# Patient Record
Sex: FEMALE | URBAN - METROPOLITAN AREA
[De-identification: names, ages, dates, MRNs, and addresses within clinical notes are randomized per-mention and may not be internally consistent; named-entity substitution may affect disease eponyms.]

---

## 2019-10-30 ENCOUNTER — OUTPATIENT (OUTPATIENT)
Dept: OUTPATIENT SERVICES | Facility: HOSPITAL | Age: 69
LOS: 1 days | End: 2019-10-30
Payer: MEDICARE

## 2019-10-30 DIAGNOSIS — M86.172 OTHER ACUTE OSTEOMYELITIS, LEFT ANKLE AND FOOT: ICD-10-CM

## 2019-10-31 PROCEDURE — 78103 BONE MARROW IMAGING MULT: CPT

## 2019-10-31 PROCEDURE — 78805: CPT | Mod: 26

## 2019-10-31 PROCEDURE — 78800 RP LOCLZJ TUM 1 AREA 1 D IMG: CPT

## 2019-10-31 PROCEDURE — 78103 BONE MARROW IMAGING MULT: CPT | Mod: 26

## 2020-01-21 ENCOUNTER — APPOINTMENT (OUTPATIENT)
Dept: RHEUMATOLOGY | Facility: CLINIC | Age: 70
End: 2020-01-21
Payer: MEDICARE

## 2020-01-21 ENCOUNTER — TRANSCRIPTION ENCOUNTER (OUTPATIENT)
Age: 70
End: 2020-01-21

## 2020-01-21 VITALS
WEIGHT: 125 LBS | SYSTOLIC BLOOD PRESSURE: 144 MMHG | HEART RATE: 83 BPM | OXYGEN SATURATION: 96 % | DIASTOLIC BLOOD PRESSURE: 85 MMHG | HEIGHT: 62 IN | BODY MASS INDEX: 23 KG/M2

## 2020-01-21 DIAGNOSIS — Z87.09 PERSONAL HISTORY OF OTHER DISEASES OF THE RESPIRATORY SYSTEM: ICD-10-CM

## 2020-01-21 DIAGNOSIS — Z86.79 PERSONAL HISTORY OF OTHER DISEASES OF THE CIRCULATORY SYSTEM: ICD-10-CM

## 2020-01-21 DIAGNOSIS — Z82.61 FAMILY HISTORY OF ARTHRITIS: ICD-10-CM

## 2020-01-21 DIAGNOSIS — Z78.9 OTHER SPECIFIED HEALTH STATUS: ICD-10-CM

## 2020-01-21 PROBLEM — Z00.00 ENCOUNTER FOR PREVENTIVE HEALTH EXAMINATION: Status: ACTIVE | Noted: 2020-01-21

## 2020-01-21 PROCEDURE — 99205 OFFICE O/P NEW HI 60 MIN: CPT

## 2020-01-21 NOTE — DATA REVIEWED
[FreeTextEntry1] : MRI left foot July 15 2019\par - non-displaced fracture proximal metatarsal\par - mild degenerative changes at first MTP\par - soft tissue edema\par - intermetatarsal neuroma 2nd web space\par - non-aggressive appearing lesion in the proximal aspect of the third metatarsal - ? enchondroma\par \par MRI left foot September 19 2019\par - new hypointense T1 hyperintense T2 signal cubcutaneous mass  - extending to the dorsal surface of the medal mid navicular - c/w abcess with sinus tract - marrow edema and erosive changes of the navicular-cuneiform aritculation -c/w osteomyelitis with reactive osteitis\par -  no joint effusion\par \par note from ID - suggests negative bone scan but raises neuropathic component

## 2020-01-21 NOTE — ASSESSMENT
[FreeTextEntry1] : 70 yo woman hx of asthma (steroid use), htn (hctz in summer) presenting with chronic foot pain and swelling\par \par #chronic left foot pain - with swelling, warmth, erythema and loss of function folowing a fracture and ? om\par - ? RSD - bone scan triple phase - will check with id to ensure they didn’t do this already \par - initial event seems to be the fracture in july - though no trauma and unsuall site c/b abcess.  Was on HCTZ at the time, though response to prednisone wasn't complete. doubt gout.\par - no other signs of inflammatory ctd on exam \par - check serologies\par - id note suggests bone scan with neuropathic component - LMOM - with Dr. Bullard (ID) in [place] - to further discuss and retrieve records.\par \par \par #Bone health.  has had BSO and steroid use - risk is high for OP. now with unexplained fracture\par  - DEXA for fracture, check vit d\par - id following for source of infection initially\par - r/o gout - cehck sUA\par \par d/w podiatry Dr. Antoine.   July's swelling felt to be 2/2 new fracture as xray negative for fracture but MRI positive.  was treated as fracture, though swelli gand redness persistent.  unclear what preciptated the infections as well.  has not had a triple phase bone scan. \par \par LM with id\par \par

## 2020-01-21 NOTE — PHYSICAL EXAM
[General Appearance - Alert] : alert [General Appearance - In No Acute Distress] : in no acute distress [General Appearance - Well Nourished] : well nourished [General Appearance - Well Developed] : well developed [General Appearance - Well-Appearing] : healthy appearing [PERRL With Normal Accommodation] : pupils were equal in size, round, and reactive to light [Sclera] : the sclera and conjunctiva were normal [Extraocular Movements] : extraocular movements were intact [Oropharynx] : the oropharynx was normal [Outer Ear] : the ears and nose were normal in appearance [Neck Cervical Mass (___cm)] : no neck mass was observed [Neck Appearance] : the appearance of the neck was normal [Thyroid Diffuse Enlargement] : the thyroid was not enlarged [Jugular Venous Distention Increased] : there was no jugular-venous distention [Thyroid Nodule] : there were no palpable thyroid nodules [Auscultation Breath Sounds / Voice Sounds] : lungs were clear to auscultation bilaterally [Heart Sounds Gallop] : no gallops [Heart Sounds] : normal S1 and S2 [Heart Rate And Rhythm] : heart rate was normal and rhythm regular [Murmurs] : no murmurs [Heart Sounds Pericardial Friction Rub] : no pericardial rub [Edema] : there was no peripheral edema [Cervical Lymph Nodes Enlarged Posterior Bilaterally] : posterior cervical [Full Pulse] : the pedal pulses are present [Cervical Lymph Nodes Enlarged Anterior Bilaterally] : anterior cervical [Supraclavicular Lymph Nodes Enlarged Bilaterally] : supraclavicular [No CVA Tenderness] : no ~M costovertebral angle tenderness [No Spinal Tenderness] : no spinal tenderness [Abnormal Walk] : normal gait [Nail Clubbing] : no clubbing  or cyanosis of the fingernails [Musculoskeletal - Swelling] : no joint swelling seen [Motor Tone] : muscle strength and tone were normal [Skin Color & Pigmentation] : normal skin color and pigmentation [Skin Turgor] : normal skin turgor [FreeTextEntry1] : left > right CMC joitn squaring, no synovitis, tinels and phalens negatvie.   left foot with mild heat, erythema, dorsal swelling, decreased rom at tt joint. not sensitive to touch [Deep Tendon Reflexes (DTR)] : deep tendon reflexes were 2+ and symmetric [Skin Lesions] : no skin lesions [] : no rash [Motor Exam] : the motor exam was normal [No Focal Deficits] : no focal deficits [Sensation] : the sensory exam was normal to light touch and pinprick [Impaired Insight] : insight and judgment were intact [Oriented To Time, Place, And Person] : oriented to person, place, and time [Affect] : the affect was normal [Memory Recent] : recent memory was not impaired [Mood] : the mood was normal [Memory Remote] : remote memory was not impaired

## 2020-01-21 NOTE — REASON FOR VISIT
[Consultation] : a consultation visit [FreeTextEntry1] : referred to Dr. kris Tolliver (podiatry), Dr. Daphnie Mas (PCP)

## 2020-01-21 NOTE — HISTORY OF PRESENT ILLNESS
[None] : The patient is currently asymptomatic [FreeTextEntry1] : 68 yo woman pmhx of Asthma, HTN here with left foot pain\par \par Ms. Ortega awakened on July 4 2019 with acute pain, swelling, redness and heat of the lfet foot.  toes were not involved, nor was the ankle.  there was no antecedent trauma and not other arthritis.   Pain was excruciating and went to urgent care.  Started treatment with prednisone that did not alleviate the symptoms much.  \par \par Saw podiatry ([facility] foot [facility] - dr ponce).  treated with 6 weeks  of boot.   course was complicated by infection +/- OM x 2 treated with abx.  never had fever, chills, ns and workup reportedly negative.  ID is dr sung (id) [facility] \par \par Foot is still not right.  pain is not excruciating but it is still swollen and a bit off color - red.   It is no longer sensitive to touch and can bear weight.  the end of the day is worse than the beginning of the day./  swelling progresses.  \par \par rheum ros is negative for inflammatory symptoms or associated symptoms\par - other joints are without pain except for occasional  pain at the left cmc joint.  \par - denies heat, redness or sweelling in other joints.\par - denies parasthesais or numbness in this or other joits\par - denies posriasis, ibd, sti, infalmmatory diarrhea\par - denies sle or ra s/s\par - never had gout, rarely drinks alcohol, doesn’t eat red meat and never has organ meats.  has 3 shrimp on occasion\par \par Bone health\par -hx of steroid use 2/2 asthma \par -hasn’t had bone density in years\par - no prior fracture\par - no height loss [de-identified] : - denies fever, chills, night sweats.  no constitutional symptoms\par - denies trauma, osteoporosis\par - ID = Jackson Jimenez ([place]) -

## 2020-01-23 LAB
25(OH)D3 SERPL-MCNC: 47.5 NG/ML
ALBUMIN SERPL ELPH-MCNC: 4.8 G/DL
ALP BLD-CCNC: 107 U/L
ALT SERPL-CCNC: 20 U/L
ANION GAP SERPL CALC-SCNC: 17 MMOL/L
AST SERPL-CCNC: 29 U/L
BASOPHILS # BLD AUTO: 0.09 K/UL
BASOPHILS NFR BLD AUTO: 1 %
BILIRUB SERPL-MCNC: 0.3 MG/DL
BUN SERPL-MCNC: 19 MG/DL
CALCIUM SERPL-MCNC: 9.8 MG/DL
CHLORIDE SERPL-SCNC: 108 MMOL/L
CO2 SERPL-SCNC: 17 MMOL/L
CREAT SERPL-MCNC: 1.17 MG/DL
CRP SERPL-MCNC: 0.6 MG/DL
EOSINOPHIL # BLD AUTO: 0.24 K/UL
EOSINOPHIL NFR BLD AUTO: 2.5 %
ERYTHROCYTE [SEDIMENTATION RATE] IN BLOOD BY WESTERGREN METHOD: 35 MM/HR
HCT VFR BLD CALC: 37.5 %
HGB BLD-MCNC: 11.8 G/DL
IMM GRANULOCYTES NFR BLD AUTO: 0.3 %
LYMPHOCYTES # BLD AUTO: 2.52 K/UL
LYMPHOCYTES NFR BLD AUTO: 26.6 %
MAN DIFF?: NORMAL
MCHC RBC-ENTMCNC: 29.6 PG
MCHC RBC-ENTMCNC: 31.5 GM/DL
MCV RBC AUTO: 94.2 FL
MONOCYTES # BLD AUTO: 0.74 K/UL
MONOCYTES NFR BLD AUTO: 7.8 %
NEUTROPHILS # BLD AUTO: 5.84 K/UL
NEUTROPHILS NFR BLD AUTO: 61.8 %
PLATELET # BLD AUTO: 478 K/UL
POTASSIUM SERPL-SCNC: 4.2 MMOL/L
PROT SERPL-MCNC: 7.7 G/DL
RBC # BLD: 3.98 M/UL
RBC # FLD: 15.3 %
SODIUM SERPL-SCNC: 143 MMOL/L
TSH SERPL-ACNC: 3.31 UIU/ML
URATE SERPL-MCNC: 7.7 MG/DL
WBC # FLD AUTO: 9.46 K/UL

## 2020-02-20 ENCOUNTER — FORM ENCOUNTER (OUTPATIENT)
Age: 70
End: 2020-02-20

## 2020-02-21 ENCOUNTER — APPOINTMENT (OUTPATIENT)
Dept: NUCLEAR MEDICINE | Facility: IMAGING CENTER | Age: 70
End: 2020-02-21
Payer: MEDICARE

## 2020-02-21 ENCOUNTER — OUTPATIENT (OUTPATIENT)
Dept: OUTPATIENT SERVICES | Facility: HOSPITAL | Age: 70
LOS: 1 days | End: 2020-02-21
Payer: MEDICARE

## 2020-02-21 DIAGNOSIS — T14.8XXA OTHER INJURY OF UNSPECIFIED BODY REGION, INITIAL ENCOUNTER: ICD-10-CM

## 2020-02-21 PROCEDURE — 78315 BONE IMAGING 3 PHASE: CPT | Mod: 26

## 2020-02-21 PROCEDURE — A9561: CPT

## 2020-02-21 PROCEDURE — 78315 BONE IMAGING 3 PHASE: CPT

## 2020-02-28 ENCOUNTER — APPOINTMENT (OUTPATIENT)
Dept: RHEUMATOLOGY | Facility: CLINIC | Age: 70
End: 2020-02-28
Payer: MEDICARE

## 2020-02-28 VITALS
OXYGEN SATURATION: 99 % | HEART RATE: 88 BPM | HEIGHT: 62 IN | BODY MASS INDEX: 23 KG/M2 | WEIGHT: 125 LBS | SYSTOLIC BLOOD PRESSURE: 157 MMHG | DIASTOLIC BLOOD PRESSURE: 80 MMHG

## 2020-02-28 DIAGNOSIS — T14.8XXA OTHER INJURY OF UNSPECIFIED BODY REGION, INITIAL ENCOUNTER: ICD-10-CM

## 2020-02-28 DIAGNOSIS — M79.672 PAIN IN LEFT FOOT: ICD-10-CM

## 2020-02-28 PROCEDURE — 99214 OFFICE O/P EST MOD 30 MIN: CPT

## 2020-02-28 NOTE — ASSESSMENT
[FreeTextEntry1] : 68 yo woman hx of asthma (steroid use), htn (hctz in summer) presenting with chronic foot pain and swelling\par \par #chronic left foot pain - intermittent non-inflammatory at this point\par - although sUA is slightly eleveated clinical picutre no longer aligns with crystalline, gout.\par - bone scan negative for RSD\par - likely slowly resolved after fracture and OM (?)\par - bone scan report rec f/u MRI\par - if new symptoms or inflammatory symptoms restart - consider aspiration or DECT.\par - observe for now\par \par #Bone health.  has had BSO and steroid use - risk is high for OP. now with unexplained fracture\par  - DEXA for fracture, check vit d\par \par \par

## 2020-02-28 NOTE — HISTORY OF PRESENT ILLNESS
[None] : The patient is currently asymptomatic [FreeTextEntry1] : 70 yo woman pmhx of Asthma, HTN here with left foot pain\par \par Ms. Ortega awakened on July 4 2019 with acute pain, swelling, redness and heat of the lfet foot.  toes were not involved, nor was the ankle.  there was no antecedent trauma and not other arthritis.   Pain was excruciating and went to urgent care.  Started treatment with prednisone that did not alleviate the symptoms much.  \par \par Saw podiatry ([facility] foot care - dr ponce).  treated with 6 weeks  of boot.   course was complicated by infection +/- OM x 2 treated with abx.  never had fever, chills, ns and workup reportedly negative.  ID is dr sung (id) [facility] \par \par Foot is still not right.  pain is not excruciating but it is still swollen and a bit off color - red.   It is no longer sensitive to touch and can bear weight.  the end of the day is worse than the beginning of the day./  swelling progresses.  \par \par rheum ros is negative for inflammatory symptoms or associated symptoms\par - other joints are without pain except for occasional  pain at the left cmc joint.  \par - denies heat, redness or sweelling in other joints.\par - denies parasthesais or numbness in this or other joits\par - denies posriasis, ibd, sti, infalmmatory diarrhea\par - denies sle or ra s/s\par - never had gout, rarely drinks alcohol, doesn’t eat red meat and never has organ meats.  has 3 shrimp on occasion\par \par Bone health\par -hx of steroid use 2/2 asthma \par -hasn’t had bone density in years\par - no prior fracture\par - no height loss [de-identified] : - denies fever, chills, night sweats.  no constitutional symptoms\par - denies trauma, osteoporosis\par - ID = Jackson Jimenez ([place]) -

## 2020-02-28 NOTE — DATA REVIEWED
[FreeTextEntry1] : Bone scan 2020\par \par MRI left foot July 15 2019\par - non-displaced fracture proximal metatarsal\par - mild degenerative changes at first MTP\par - soft tissue edema\par - intermetatarsal neuroma 2nd web space\par - non-aggressive appearing lesion in the proximal aspect of the third metatarsal - ? enchondroma\par \par MRI left foot September 19 2019\par - new hypointense T1 hyperintense T2 signal cubcutaneous mass  - extending to the dorsal surface of the medal mid navicular - c/w abcess with sinus tract - marrow edema and erosive changes of the navicular-cuneiform aritculation -c/w osteomyelitis with reactive osteitis\par -  no joint effusion\par \par note from ID - suggests negative bone scan but raises neuropathic component

## 2020-02-28 NOTE — PHYSICAL EXAM
[General Appearance - Alert] : alert [General Appearance - Well Nourished] : well nourished [General Appearance - In No Acute Distress] : in no acute distress [General Appearance - Well Developed] : well developed [General Appearance - Well-Appearing] : healthy appearing [PERRL With Normal Accommodation] : pupils were equal in size, round, and reactive to light [Sclera] : the sclera and conjunctiva were normal [Extraocular Movements] : extraocular movements were intact [Outer Ear] : the ears and nose were normal in appearance [Oropharynx] : the oropharynx was normal [Neck Cervical Mass (___cm)] : no neck mass was observed [Neck Appearance] : the appearance of the neck was normal [Jugular Venous Distention Increased] : there was no jugular-venous distention [Thyroid Diffuse Enlargement] : the thyroid was not enlarged [Thyroid Nodule] : there were no palpable thyroid nodules [Auscultation Breath Sounds / Voice Sounds] : lungs were clear to auscultation bilaterally [Heart Rate And Rhythm] : heart rate was normal and rhythm regular [Heart Sounds] : normal S1 and S2 [Heart Sounds Gallop] : no gallops [Murmurs] : no murmurs [Heart Sounds Pericardial Friction Rub] : no pericardial rub [Full Pulse] : the pedal pulses are present [Edema] : there was no peripheral edema [Cervical Lymph Nodes Enlarged Posterior Bilaterally] : posterior cervical [Cervical Lymph Nodes Enlarged Anterior Bilaterally] : anterior cervical [Supraclavicular Lymph Nodes Enlarged Bilaterally] : supraclavicular [No CVA Tenderness] : no ~M costovertebral angle tenderness [No Spinal Tenderness] : no spinal tenderness [Abnormal Walk] : normal gait [Nail Clubbing] : no clubbing  or cyanosis of the fingernails [Musculoskeletal - Swelling] : no joint swelling seen [Motor Tone] : muscle strength and tone were normal [Skin Color & Pigmentation] : normal skin color and pigmentation [Skin Turgor] : normal skin turgor [] : no rash [Skin Lesions] : no skin lesions [Deep Tendon Reflexes (DTR)] : deep tendon reflexes were 2+ and symmetric [Sensation] : the sensory exam was normal to light touch and pinprick [Motor Exam] : the motor exam was normal [No Focal Deficits] : no focal deficits [Oriented To Time, Place, And Person] : oriented to person, place, and time [Impaired Insight] : insight and judgment were intact [Affect] : the affect was normal [Mood] : the mood was normal [Memory Recent] : recent memory was not impaired [Memory Remote] : remote memory was not impaired [FreeTextEntry1] : left > right CMC joitn squaring, no synovitis, tinels and phalens negatvie.  Left foot soft tissue swellign dorsal - NT, cool, no redness, no sensitivity

## 2024-12-31 ENCOUNTER — OUTPATIENT (OUTPATIENT)
Dept: OUTPATIENT SERVICES | Facility: HOSPITAL | Age: 74
LOS: 1 days | End: 2024-12-31
Payer: MEDICARE

## 2024-12-31 VITALS
WEIGHT: 115.96 LBS | HEART RATE: 80 BPM | TEMPERATURE: 98 F | SYSTOLIC BLOOD PRESSURE: 130 MMHG | OXYGEN SATURATION: 99 % | RESPIRATION RATE: 14 BRPM | DIASTOLIC BLOOD PRESSURE: 77 MMHG | HEIGHT: 60 IN

## 2024-12-31 DIAGNOSIS — G56.01 CARPAL TUNNEL SYNDROME, RIGHT UPPER LIMB: ICD-10-CM

## 2024-12-31 DIAGNOSIS — Z01.818 ENCOUNTER FOR OTHER PREPROCEDURAL EXAMINATION: ICD-10-CM

## 2024-12-31 DIAGNOSIS — S62.101A FRACTURE OF UNSPECIFIED CARPAL BONE, RIGHT WRIST, INITIAL ENCOUNTER FOR CLOSED FRACTURE: Chronic | ICD-10-CM

## 2024-12-31 DIAGNOSIS — R22.31 LOCALIZED SWELLING, MASS AND LUMP, RIGHT UPPER LIMB: ICD-10-CM

## 2024-12-31 DIAGNOSIS — G56.21 LESION OF ULNAR NERVE, RIGHT UPPER LIMB: ICD-10-CM

## 2024-12-31 DIAGNOSIS — Z90.710 ACQUIRED ABSENCE OF BOTH CERVIX AND UTERUS: Chronic | ICD-10-CM

## 2024-12-31 LAB
ANION GAP SERPL CALC-SCNC: 13 MMOL/L — SIGNIFICANT CHANGE UP (ref 5–17)
BUN SERPL-MCNC: 34 MG/DL — HIGH (ref 7–23)
CALCIUM SERPL-MCNC: 9.7 MG/DL — SIGNIFICANT CHANGE UP (ref 8.4–10.5)
CHLORIDE SERPL-SCNC: 104 MMOL/L — SIGNIFICANT CHANGE UP (ref 96–108)
CO2 SERPL-SCNC: 22 MMOL/L — SIGNIFICANT CHANGE UP (ref 22–31)
CREAT SERPL-MCNC: 1.68 MG/DL — HIGH (ref 0.5–1.3)
EGFR: 32 ML/MIN/1.73M2 — LOW
GLUCOSE SERPL-MCNC: 121 MG/DL — HIGH (ref 70–99)
HCT VFR BLD CALC: 33.4 % — LOW (ref 34.5–45)
HGB BLD-MCNC: 10.7 G/DL — LOW (ref 11.5–15.5)
MCHC RBC-ENTMCNC: 30.7 PG — SIGNIFICANT CHANGE UP (ref 27–34)
MCHC RBC-ENTMCNC: 32 G/DL — SIGNIFICANT CHANGE UP (ref 32–36)
MCV RBC AUTO: 96 FL — SIGNIFICANT CHANGE UP (ref 80–100)
NRBC # BLD: 0 /100 WBCS — SIGNIFICANT CHANGE UP (ref 0–0)
PLATELET # BLD AUTO: 389 K/UL — SIGNIFICANT CHANGE UP (ref 150–400)
POTASSIUM SERPL-MCNC: 4.5 MMOL/L — SIGNIFICANT CHANGE UP (ref 3.5–5.3)
POTASSIUM SERPL-SCNC: 4.5 MMOL/L — SIGNIFICANT CHANGE UP (ref 3.5–5.3)
RBC # BLD: 3.48 M/UL — LOW (ref 3.8–5.2)
RBC # FLD: 14.2 % — SIGNIFICANT CHANGE UP (ref 10.3–14.5)
SODIUM SERPL-SCNC: 139 MMOL/L — SIGNIFICANT CHANGE UP (ref 135–145)
WBC # BLD: 10.26 K/UL — SIGNIFICANT CHANGE UP (ref 3.8–10.5)
WBC # FLD AUTO: 10.26 K/UL — SIGNIFICANT CHANGE UP (ref 3.8–10.5)

## 2024-12-31 PROCEDURE — 80048 BASIC METABOLIC PNL TOTAL CA: CPT

## 2024-12-31 PROCEDURE — 93010 ELECTROCARDIOGRAM REPORT: CPT

## 2024-12-31 PROCEDURE — 85027 COMPLETE CBC AUTOMATED: CPT

## 2024-12-31 PROCEDURE — 36415 COLL VENOUS BLD VENIPUNCTURE: CPT

## 2024-12-31 PROCEDURE — G0463: CPT

## 2024-12-31 PROCEDURE — 93005 ELECTROCARDIOGRAM TRACING: CPT

## 2024-12-31 NOTE — H&P PST ADULT - NSICDXPROCEDURE_GEN_ALL_CORE_FT
PROCEDURES:  Endoscopic carpal tunnel release of right wrist 31-Dec-2024 10:20:06 and ulnar nerve decompression at the wrist,elbow Vale Proctor

## 2024-12-31 NOTE — H&P PST ADULT - NSANTHOSAYNRD_GEN_A_CORE
No. SAEED screening performed.  STOP BANG Legend: 0-2 = LOW Risk; 3-4 = INTERMEDIATE Risk; 5-8 = HIGH Risk

## 2024-12-31 NOTE — H&P PST ADULT - REASON FOR ADMISSION
I am having rigt carpal tunnel and elbow surgery 'I am having right carpal tunnel and elbow surgery'

## 2024-12-31 NOTE — H&P PST ADULT - PROBLEM SELECTOR PLAN 1
scheduled for right carpal tunnel syndrome on 1/17/25  pre op instructions on wash and medications  will obtain medical and pulmonary clearance scheduled for right carpal tunnel release and ulnar nerve decompression on 1/17/25  pre op instructions on wash and medications  will obtain medical and pulmonary clearance

## 2024-12-31 NOTE — H&P PST ADULT - NSICDXPASTMEDICALHX_GEN_ALL_CORE_FT
PAST MEDICAL HISTORY:  Asthma     Chronic cough     GERD (gastroesophageal reflux disease)     Gout     HTN (hypertension)     Right carpal tunnel syndrome      PAST MEDICAL HISTORY:  Anemia     Asthma     Chronic cough     GERD (gastroesophageal reflux disease)     Gout     HTN (hypertension)     Overactive bladder     Right carpal tunnel syndrome

## 2024-12-31 NOTE — H&P PST ADULT - HISTORY OF PRESENT ILLNESS
73 y/o female who had undergone KRISTINE F right wrist  3/1/24presents with right hand  pain ,swelling ,umbness and tingling to right ring and pinky fingers since the surgery , Reports right hand  decreased strength and dropping things.scheduled for right carpal tunnel release  73 y/o female who had undergone KRISTINE F right wrist  3/1/24 presents with right hand  pain ,swelling ,numbness and tingling to right ring and pinky fingers since the surgery , Reports right hand  decreased strength and dropping things. scheduled for right carpal tunnel release and ulnar nerve decompression on 1/17/25

## 2024-12-31 NOTE — H&P PST ADULT - NSICDXFAMILYHX_GEN_ALL_CORE_FT
FAMILY HISTORY:  Father  Still living? No  FH: CHF (congestive heart failure), Age at diagnosis: Age Unknown    Mother  Still living? No  Family history of cerebral hemorrhage, Age at diagnosis: Age Unknown

## 2024-12-31 NOTE — H&P PST ADULT - MUSCULOSKELETAL COMMENTS
Right carpal tunnel syndrome right hand ; reports right hand ; reports numbness and tingling right ring and pinky finger

## 2025-01-17 ENCOUNTER — OUTPATIENT (OUTPATIENT)
Dept: OUTPATIENT SERVICES | Facility: HOSPITAL | Age: 75
LOS: 1 days | End: 2025-01-17
Payer: MEDICARE

## 2025-01-17 ENCOUNTER — TRANSCRIPTION ENCOUNTER (OUTPATIENT)
Age: 75
End: 2025-01-17

## 2025-01-17 VITALS
OXYGEN SATURATION: 100 % | HEART RATE: 86 BPM | DIASTOLIC BLOOD PRESSURE: 52 MMHG | SYSTOLIC BLOOD PRESSURE: 126 MMHG | RESPIRATION RATE: 15 BRPM

## 2025-01-17 VITALS
OXYGEN SATURATION: 100 % | SYSTOLIC BLOOD PRESSURE: 155 MMHG | HEIGHT: 65 IN | WEIGHT: 115.3 LBS | DIASTOLIC BLOOD PRESSURE: 72 MMHG | RESPIRATION RATE: 10 BRPM | HEART RATE: 80 BPM | TEMPERATURE: 97 F

## 2025-01-17 DIAGNOSIS — G56.21 LESION OF ULNAR NERVE, RIGHT UPPER LIMB: ICD-10-CM

## 2025-01-17 DIAGNOSIS — G56.01 CARPAL TUNNEL SYNDROME, RIGHT UPPER LIMB: ICD-10-CM

## 2025-01-17 DIAGNOSIS — R22.31 LOCALIZED SWELLING, MASS AND LUMP, RIGHT UPPER LIMB: ICD-10-CM

## 2025-01-17 DIAGNOSIS — Z90.710 ACQUIRED ABSENCE OF BOTH CERVIX AND UTERUS: Chronic | ICD-10-CM

## 2025-01-17 DIAGNOSIS — Z01.818 ENCOUNTER FOR OTHER PREPROCEDURAL EXAMINATION: ICD-10-CM

## 2025-01-17 DIAGNOSIS — S62.101A FRACTURE OF UNSPECIFIED CARPAL BONE, RIGHT WRIST, INITIAL ENCOUNTER FOR CLOSED FRACTURE: Chronic | ICD-10-CM

## 2025-01-17 PROCEDURE — 64721 CARPAL TUNNEL SURGERY: CPT | Mod: RT

## 2025-01-17 PROCEDURE — 64718 REVISE ULNAR NERVE AT ELBOW: CPT | Mod: RT

## 2025-01-17 PROCEDURE — 64719 REVISE ULNAR NERVE AT WRIST: CPT | Mod: XU,RT

## 2025-01-17 RX ORDER — IRBESARTAN AND HYDROCHLOROTHIAZIDE 12.5; 3 MG/1; MG/1
1 TABLET ORAL
Refills: 0 | DISCHARGE

## 2025-01-17 RX ORDER — OXYBUTYNIN CHLORIDE 5 MG/1
1 TABLET ORAL
Refills: 0 | DISCHARGE

## 2025-01-17 RX ORDER — FLUTICASONE FUROATE, UMECLIDINIUM BROMIDE AND VILANTEROL TRIFENATATE 100; 62.5; 25 UG/1; UG/1; UG/1
1 POWDER RESPIRATORY (INHALATION)
Refills: 0 | DISCHARGE

## 2025-01-17 RX ORDER — DICLOFENAC SODIUM 75 MG
1 TABLET, DELAYED RELEASE (ENTERIC COATED) ORAL
Refills: 0 | DISCHARGE

## 2025-01-17 RX ORDER — AZELASTINE HYDROCHLORIDE 137 UG/1
1 SPRAY, METERED NASAL
Refills: 0 | DISCHARGE

## 2025-01-17 RX ORDER — PANTOPRAZOLE 40 MG/1
1 TABLET, DELAYED RELEASE ORAL
Refills: 0 | DISCHARGE

## 2025-01-17 RX ORDER — BENRALIZUMAB 30 MG/ML
30 INJECTION, SOLUTION SUBCUTANEOUS
Refills: 0 | DISCHARGE

## 2025-01-17 RX ORDER — MONTELUKAST SODIUM 10 MG/1
1 TABLET, FILM COATED ORAL
Refills: 0 | DISCHARGE

## 2025-01-17 RX ORDER — CHLORHEXIDINE GLUCONATE 1.2 MG/ML
1 RINSE ORAL ONCE
Refills: 0 | Status: COMPLETED | OUTPATIENT
Start: 2025-01-17 | End: 2025-01-17

## 2025-01-17 RX ORDER — HYDROMORPHONE HCL 4 MG
0.5 TABLET ORAL
Refills: 0 | Status: DISCONTINUED | OUTPATIENT
Start: 2025-01-17 | End: 2025-01-17

## 2025-01-17 RX ORDER — FLUTICASONE PROPIONATE 50 UG/1
2 SPRAY, METERED NASAL
Refills: 0 | DISCHARGE

## 2025-01-17 RX ORDER — CETIRIZINE HYDROCHLORIDE 5 MG/5ML
1 SYRUP ORAL
Refills: 0 | DISCHARGE

## 2025-01-17 RX ORDER — BENZONATATE 100 MG
1 CAPSULE ORAL
Refills: 0 | DISCHARGE

## 2025-01-17 RX ORDER — SODIUM CHLORIDE 9 MG/ML
1000 INJECTION, SOLUTION INTRAVENOUS
Refills: 0 | Status: DISCONTINUED | OUTPATIENT
Start: 2025-01-17 | End: 2025-01-17

## 2025-01-17 RX ORDER — ONDANSETRON 4 MG/1
4 TABLET ORAL ONCE
Refills: 0 | Status: DISCONTINUED | OUTPATIENT
Start: 2025-01-17 | End: 2025-01-17

## 2025-01-17 RX ORDER — COLCHICINE 0.6 MG/1
1 CAPSULE ORAL
Refills: 0 | DISCHARGE

## 2025-01-17 RX ORDER — HYDROMORPHONE HCL 4 MG
0.2 TABLET ORAL
Refills: 0 | Status: DISCONTINUED | OUTPATIENT
Start: 2025-01-17 | End: 2025-01-17

## 2025-01-17 RX ORDER — CEFAZOLIN SODIUM 1 G
2000 VIAL (EA) INJECTION ONCE
Refills: 0 | Status: COMPLETED | OUTPATIENT
Start: 2025-01-17 | End: 2025-01-17

## 2025-01-17 RX ORDER — APREPITANT 40 MG/1
40 CAPSULE ORAL ONCE
Refills: 0 | Status: COMPLETED | OUTPATIENT
Start: 2025-01-17 | End: 2025-01-17

## 2025-01-17 RX ORDER — ALBUTEROL SULFATE 90 UG/1
2 INHALANT RESPIRATORY (INHALATION)
Refills: 0 | DISCHARGE

## 2025-01-17 RX ORDER — IPRATROPIUM BROMIDE 42 MCG
2 AEROSOL, SPRAY (ML) NASAL
Refills: 0 | DISCHARGE

## 2025-01-17 RX ORDER — ASPIRIN 81 MG
0 TABLET, DELAYED RELEASE (ENTERIC COATED) ORAL
Refills: 0 | DISCHARGE

## 2025-01-17 RX ADMIN — CHLORHEXIDINE GLUCONATE 1 APPLICATION(S): 1.2 RINSE ORAL at 08:11

## 2025-01-17 RX ADMIN — APREPITANT 40 MILLIGRAM(S): 40 CAPSULE ORAL at 08:11

## 2025-01-17 NOTE — ASU DISCHARGE PLAN (ADULT/PEDIATRIC) - FINANCIAL ASSISTANCE
[facility] provides services at a reduced cost to those who are determined to be eligible through [facility]’s financial assistance program. Information regarding [facility]’s financial assistance program can be found by going to [email or web address] or by calling [phone].

## 2025-01-17 NOTE — BRIEF OPERATIVE NOTE - NSICDXBRIEFPROCEDURE_GEN_ALL_CORE_FT
PROCEDURES:  Decompression of right ulnar nerve at elbow 17-Jan-2025 14:11:11  Rosaura Rapp  Decompression of right Guyon's canal 17-Jan-2025 14:11:18  Rosaura Rapp  Carpal tunnel release 17-Jan-2025 14:11:26  Rosaura Rapp

## 2025-01-17 NOTE — ASU DISCHARGE PLAN (ADULT/PEDIATRIC) - ASU DC SPECIAL INSTRUCTIONSFT
Keep dressings clean & dry until follow up  Elevate hand, use sling x 24 hours  May remove ACE wrap at the ELBOW ONLY in 24-48 hours.

## 2025-01-17 NOTE — ASU PATIENT PROFILE, ADULT - NSICDXPASTMEDICALHX_GEN_ALL_CORE_FT
PAST MEDICAL HISTORY:  Anemia     Asthma     Chronic cough     GERD (gastroesophageal reflux disease)     Gout     HTN (hypertension)     Overactive bladder     Right carpal tunnel syndrome

## 2025-01-17 NOTE — BRIEF OPERATIVE NOTE - NSICDXBRIEFPREOP_GEN_ALL_CORE_FT
PRE-OP DIAGNOSIS:  Right carpal tunnel syndrome 17-Jan-2025 14:11:37  Rosaura Rapp  Lesion of ulnar nerve, right upper limb 17-Jan-2025 14:11:49  Rosaura Rapp

## 2025-01-17 NOTE — BRIEF OPERATIVE NOTE - NSICDXBRIEFPOSTOP_GEN_ALL_CORE_FT
POST-OP DIAGNOSIS:  Right carpal tunnel syndrome 17-Jan-2025 14:11:58  Rosaura Rapp  Lesion of right ulnar nerve 17-Jan-2025 14:12:10  Rosaura Rapp

## 2025-01-17 NOTE — ASU PATIENT PROFILE, ADULT - NS PRO PASSIVE SMOKE EXP
Per [facility] the lab orders were attached to code \"Z86.39 routine general medical examination at a health care facility\"    However, the office visit has more diagnosis besides the one above.  The following dx's were placed by Dr Trini Hay at time of ov from 11/4 Yes...

## 2025-01-17 NOTE — BRIEF OPERATIVE NOTE - OPERATION/FINDINGS
Ulnar nerve compression at the wrist and elbow, median nerve compression at the wrist, thickened carpal ligament

## (undated) DEVICE — DRSG KLING 3"

## (undated) DEVICE — DRAPE 3/4 SHEET 52X76"

## (undated) DEVICE — DRAPE TOWEL BLUE 17" X 24"

## (undated) DEVICE — SUT POLYSORB 5-0 18" P-13 UNDYED

## (undated) DEVICE — SYM-TOURNIQUET 3013LAAP: Type: DURABLE MEDICAL EQUIPMENT

## (undated) DEVICE — GLV 7.5 PROTEXIS (BLUE)

## (undated) DEVICE — DRSG ACE BANDAGE 4"

## (undated) DEVICE — TOURNIQUET ESMARK 4"

## (undated) DEVICE — SOL IRR POUR NS 0.9% 1000ML

## (undated) DEVICE — SLING ARM CHIEFTAIN MESH LARGE

## (undated) DEVICE — GLV 7 PROTEXIS (WHITE)

## (undated) DEVICE — IMMOBILIZER HAND ALUMINUM XL

## (undated) DEVICE — SUT MONOSOF 4-0 18" P-12

## (undated) DEVICE — VENODYNE/SCD SLEEVE CALF MEDIUM

## (undated) DEVICE — WARMING BLANKET LOWER ADULT

## (undated) DEVICE — DRSG CURITY GAUZE SPONGE 4 X 4" 12-PLY

## (undated) DEVICE — SOL IRR POUR H2O 1000ML

## (undated) DEVICE — SUT MONOSOF 3-0 18" P-12

## (undated) DEVICE — IMMOBILIZER HAND ALUMINUM LARGE

## (undated) DEVICE — PACK UPPER EXTREMITY

## (undated) DEVICE — TOURNIQUET CUFF 18" DUAL PORT DUAL BLADDER W PLC (BLACK)

## (undated) DEVICE — DRSG KLING 4"